# Patient Record
Sex: FEMALE | Race: WHITE
[De-identification: names, ages, dates, MRNs, and addresses within clinical notes are randomized per-mention and may not be internally consistent; named-entity substitution may affect disease eponyms.]

---

## 2018-05-16 ENCOUNTER — HOSPITAL ENCOUNTER (OUTPATIENT)
Dept: HOSPITAL 84 - D.MAMMO | Age: 68
Discharge: HOME | End: 2018-05-16
Attending: FAMILY MEDICINE
Payer: MEDICARE

## 2018-05-16 VITALS — BODY MASS INDEX: 30.1 KG/M2

## 2018-05-16 DIAGNOSIS — Z12.31: Primary | ICD-10-CM

## 2019-08-19 ENCOUNTER — HOSPITAL ENCOUNTER (OUTPATIENT)
Dept: HOSPITAL 84 - D.HCCARDIO | Age: 69
Discharge: HOME | End: 2019-08-19
Attending: INTERNAL MEDICINE
Payer: MEDICARE

## 2019-08-19 VITALS — BODY MASS INDEX: 30.1 KG/M2

## 2019-08-19 DIAGNOSIS — I25.10: Primary | ICD-10-CM

## 2019-08-27 NOTE — ST
PATIENT:CLIFFORD KERR                            MEDICAL RECORD: [id number]
SEX: F                                                   LOCATION:[facility]         
ORDER #:                                                 ADMISSION DATE: 08/19/19
AGE OF PATIENT: 69            
 
REFERRING PHYSICIAN:                                                             
 
INTERPRETING PHYSICIAN: DEBORA BALLESTEROS MD             

 
 
DATE OF SERVICE:  08/19/2019
 
PROCEDURE:  Nuclear stress test.
 
INDICATION:  Angina, coronary artery disease, and hypertension.
 
She was exercised on standard Lexiscan protocol with 32 mCi of sestamibi
injected at peak stress, 11 mCi used previously for rest images.
 
FINDINGS:  Gated SPECT reveals preserved ejection fraction at 72% with good wall
motion and thickening and brightening throughout all segments.  SPECT imaging
Cardiolite was used as myocardial fusion agent.  There is homogeneous uptake
throughout all segments at rest and stress with no evidence of inducible
ischemia or previous infarction.
 
OVERALL IMPRESSION:
1.  This is a normal nuclear stress test with no evidence of inducible ischemia
or previous infarction.
2.  Gated SPECT reveals a preserved ejection fraction at 72%.  In this patient
with ongoing symptomatology, the current scan does not suggest the presence of
hemodynamically significant coronary artery disease.  Evaluate noncardiac
etiology of chest pain.
 
TRANSINT:[id number] Voice Confirmation ID: [id number] DOCUMENT ID: [id number]
 
 
                                           
                                           DEBORA BALLESTEROS MD             
 
 
 
Electronically Signed by DEBORA BALLESTEROS on 08/27/19 at 1109
 
 
 
 
 
 
 
CC: THA SILVA MD                                           [id number]
DICTATION DATE: 08/20/19 1634     :     08/20/19 2328      DEP CLI 
                                                                      08/19/19
[facility]                                          
[address] [place] [address]